# Patient Record
Sex: MALE | Race: ASIAN | NOT HISPANIC OR LATINO | Employment: OTHER | URBAN - METROPOLITAN AREA
[De-identification: names, ages, dates, MRNs, and addresses within clinical notes are randomized per-mention and may not be internally consistent; named-entity substitution may affect disease eponyms.]

---

## 2018-10-13 ENCOUNTER — HOSPITAL ENCOUNTER (EMERGENCY)
Facility: HOSPITAL | Age: 83
Discharge: HOME | End: 2018-10-13
Attending: EMERGENCY MEDICINE
Payer: MEDICARE

## 2018-10-13 VITALS
OXYGEN SATURATION: 98 % | HEART RATE: 74 BPM | RESPIRATION RATE: 18 BRPM | HEIGHT: 62 IN | WEIGHT: 140 LBS | BODY MASS INDEX: 25.76 KG/M2 | SYSTOLIC BLOOD PRESSURE: 111 MMHG | TEMPERATURE: 98 F | DIASTOLIC BLOOD PRESSURE: 56 MMHG

## 2018-10-13 DIAGNOSIS — R93.89 ABNORMAL CHEST X-RAY: ICD-10-CM

## 2018-10-13 DIAGNOSIS — R07.9 CHEST PAIN: ICD-10-CM

## 2018-10-13 DIAGNOSIS — R50.9 FEVER IN ADULT: Primary | ICD-10-CM

## 2018-10-13 DIAGNOSIS — R53.1 WEAKNESS: ICD-10-CM

## 2018-10-13 DIAGNOSIS — R05.9 COUGH: ICD-10-CM

## 2018-10-13 DIAGNOSIS — J18.9 PNEUMONIA, UNSPECIFIED ORGANISM: ICD-10-CM

## 2018-10-13 LAB
ALBUMIN SERPL BCP-MCNC: 3.1 G/DL
ALP SERPL-CCNC: 66 U/L
ALT SERPL W/O P-5'-P-CCNC: 40 U/L
ANION GAP SERPL CALC-SCNC: 11 MMOL/L
AST SERPL-CCNC: 55 U/L
BACTERIA #/AREA URNS HPF: ABNORMAL /HPF
BASOPHILS # BLD AUTO: ABNORMAL K/UL
BASOPHILS NFR BLD: 0 %
BILIRUB SERPL-MCNC: 0.5 MG/DL
BILIRUB UR QL STRIP: NEGATIVE
BNP SERPL-MCNC: 35 PG/ML
BUN SERPL-MCNC: 29 MG/DL
CALCIUM SERPL-MCNC: 9.7 MG/DL
CHLORIDE SERPL-SCNC: 97 MMOL/L
CLARITY UR: CLEAR
CO2 SERPL-SCNC: 22 MMOL/L
COLOR UR: YELLOW
CREAT SERPL-MCNC: 1.5 MG/DL
DIFFERENTIAL METHOD: ABNORMAL
EOSINOPHIL # BLD AUTO: ABNORMAL K/UL
EOSINOPHIL NFR BLD: 0 %
ERYTHROCYTE [DISTWIDTH] IN BLOOD BY AUTOMATED COUNT: 12.3 %
EST. GFR  (AFRICAN AMERICAN): 49 ML/MIN/1.73 M^2
EST. GFR  (NON AFRICAN AMERICAN): 43 ML/MIN/1.73 M^2
FLUAV AG SPEC QL IA: NEGATIVE
FLUBV AG SPEC QL IA: NEGATIVE
GLUCOSE SERPL-MCNC: 322 MG/DL
GLUCOSE UR QL STRIP: ABNORMAL
HCT VFR BLD AUTO: 33.6 %
HGB BLD-MCNC: 11.8 G/DL
HGB UR QL STRIP: ABNORMAL
HYALINE CASTS #/AREA URNS LPF: 0 /LPF
HYPOCHROMIA BLD QL SMEAR: ABNORMAL
KETONES UR QL STRIP: NEGATIVE
LACTATE SERPL-SCNC: 0.7 MMOL/L
LACTATE SERPL-SCNC: 1.8 MMOL/L
LEUKOCYTE ESTERASE UR QL STRIP: NEGATIVE
LYMPHOCYTES # BLD AUTO: ABNORMAL K/UL
LYMPHOCYTES NFR BLD: 7 %
MCH RBC QN AUTO: 30.7 PG
MCHC RBC AUTO-ENTMCNC: 35.1 G/DL
MCV RBC AUTO: 88 FL
METAMYELOCYTES NFR BLD MANUAL: 1 %
MICROSCOPIC COMMENT: ABNORMAL
MONOCYTES # BLD AUTO: ABNORMAL K/UL
MONOCYTES NFR BLD: 5 %
NEUTROPHILS NFR BLD: 70 %
NEUTS BAND NFR BLD MANUAL: 17 %
NITRITE UR QL STRIP: NEGATIVE
PH UR STRIP: 5 [PH] (ref 5–8)
PLATELET # BLD AUTO: 249 K/UL
PLATELET BLD QL SMEAR: ABNORMAL
PMV BLD AUTO: 9.5 FL
POCT GLUCOSE: 260 MG/DL (ref 70–110)
POCT GLUCOSE: 306 MG/DL (ref 70–110)
POTASSIUM SERPL-SCNC: 4.1 MMOL/L
PROT SERPL-MCNC: 7.5 G/DL
PROT UR QL STRIP: ABNORMAL
RBC # BLD AUTO: 3.84 M/UL
RBC #/AREA URNS HPF: 1 /HPF (ref 0–4)
SODIUM SERPL-SCNC: 130 MMOL/L
SP GR UR STRIP: 1.02 (ref 1–1.03)
SPECIMEN SOURCE: NORMAL
SQUAMOUS #/AREA URNS HPF: 3 /HPF
TROPONIN I SERPL DL<=0.01 NG/ML-MCNC: 0.01 NG/ML
TROPONIN I SERPL DL<=0.01 NG/ML-MCNC: <0.006 NG/ML
URN SPEC COLLECT METH UR: ABNORMAL
UROBILINOGEN UR STRIP-ACNC: NEGATIVE EU/DL
WBC # BLD AUTO: 6.05 K/UL
WBC #/AREA URNS HPF: 1 /HPF (ref 0–5)
YEAST URNS QL MICRO: ABNORMAL

## 2018-10-13 PROCEDURE — 93005 ELECTROCARDIOGRAM TRACING: CPT

## 2018-10-13 PROCEDURE — 82962 GLUCOSE BLOOD TEST: CPT

## 2018-10-13 PROCEDURE — 80053 COMPREHEN METABOLIC PANEL: CPT

## 2018-10-13 PROCEDURE — 63600175 PHARM REV CODE 636 W HCPCS: Performed by: EMERGENCY MEDICINE

## 2018-10-13 PROCEDURE — 83880 ASSAY OF NATRIURETIC PEPTIDE: CPT

## 2018-10-13 PROCEDURE — 83605 ASSAY OF LACTIC ACID: CPT

## 2018-10-13 PROCEDURE — 96374 THER/PROPH/DIAG INJ IV PUSH: CPT

## 2018-10-13 PROCEDURE — 87400 INFLUENZA A/B EACH AG IA: CPT | Mod: 59

## 2018-10-13 PROCEDURE — 25000003 PHARM REV CODE 250: Performed by: NURSE PRACTITIONER

## 2018-10-13 PROCEDURE — 87040 BLOOD CULTURE FOR BACTERIA: CPT

## 2018-10-13 PROCEDURE — 85007 BL SMEAR W/DIFF WBC COUNT: CPT

## 2018-10-13 PROCEDURE — 25000003 PHARM REV CODE 250: Performed by: EMERGENCY MEDICINE

## 2018-10-13 PROCEDURE — 99285 EMERGENCY DEPT VISIT HI MDM: CPT | Mod: 25

## 2018-10-13 PROCEDURE — 85027 COMPLETE CBC AUTOMATED: CPT

## 2018-10-13 PROCEDURE — 93010 ELECTROCARDIOGRAM REPORT: CPT | Mod: ,,, | Performed by: INTERNAL MEDICINE

## 2018-10-13 PROCEDURE — 81000 URINALYSIS NONAUTO W/SCOPE: CPT

## 2018-10-13 PROCEDURE — 96361 HYDRATE IV INFUSION ADD-ON: CPT

## 2018-10-13 PROCEDURE — 84484 ASSAY OF TROPONIN QUANT: CPT | Mod: 91

## 2018-10-13 RX ORDER — ASPIRIN 325 MG
325 TABLET ORAL
Status: COMPLETED | OUTPATIENT
Start: 2018-10-13 | End: 2018-10-13

## 2018-10-13 RX ORDER — ACETAMINOPHEN 500 MG
1000 TABLET ORAL
Status: COMPLETED | OUTPATIENT
Start: 2018-10-13 | End: 2018-10-13

## 2018-10-13 RX ORDER — DIPHENHYDRAMINE HCL 25 MG
25 CAPSULE ORAL
Status: COMPLETED | OUTPATIENT
Start: 2018-10-13 | End: 2018-10-13

## 2018-10-13 RX ORDER — ALLOPURINOL 100 MG/1
100 TABLET ORAL DAILY
COMMUNITY

## 2018-10-13 RX ORDER — FENOFIBRATE 160 MG/1
160 TABLET ORAL DAILY
COMMUNITY

## 2018-10-13 RX ORDER — DIPHENHYDRAMINE HCL 25 MG
25 CAPSULE ORAL EVERY 6 HOURS PRN
Qty: 20 CAPSULE | Refills: 0 | Status: SHIPPED | OUTPATIENT
Start: 2018-10-13

## 2018-10-13 RX ORDER — MOXIFLOXACIN HYDROCHLORIDE 400 MG/1
400 TABLET ORAL DAILY
Status: DISCONTINUED | OUTPATIENT
Start: 2018-10-14 | End: 2018-10-13

## 2018-10-13 RX ORDER — MOXIFLOXACIN HYDROCHLORIDE 400 MG/1
400 TABLET ORAL
Status: COMPLETED | OUTPATIENT
Start: 2018-10-13 | End: 2018-10-13

## 2018-10-13 RX ORDER — ATORVASTATIN CALCIUM 40 MG/1
40 TABLET, FILM COATED ORAL DAILY
COMMUNITY

## 2018-10-13 RX ORDER — METHYLPREDNISOLONE SOD SUCC 125 MG
125 VIAL (EA) INJECTION
Status: COMPLETED | OUTPATIENT
Start: 2018-10-13 | End: 2018-10-13

## 2018-10-13 RX ORDER — LEVOFLOXACIN 750 MG/1
750 TABLET ORAL DAILY
Qty: 5 TABLET | Refills: 0 | Status: SHIPPED | OUTPATIENT
Start: 2018-10-13 | End: 2018-10-18

## 2018-10-13 RX ADMIN — DIPHENHYDRAMINE HYDROCHLORIDE 25 MG: 25 CAPSULE ORAL at 04:10

## 2018-10-13 RX ADMIN — ACETAMINOPHEN 1000 MG: 500 TABLET, FILM COATED ORAL at 03:10

## 2018-10-13 RX ADMIN — SODIUM CHLORIDE 1905 ML: 0.9 INJECTION, SOLUTION INTRAVENOUS at 02:10

## 2018-10-13 RX ADMIN — MOXIFLOXACIN HYDROCHLORIDE 400 MG: 400 TABLET, FILM COATED ORAL at 08:10

## 2018-10-13 RX ADMIN — ASPIRIN 325 MG ORAL TABLET 325 MG: 325 PILL ORAL at 03:10

## 2018-10-13 RX ADMIN — METHYLPREDNISOLONE SODIUM SUCCINATE 125 MG: 125 INJECTION, POWDER, FOR SOLUTION INTRAMUSCULAR; INTRAVENOUS at 04:10

## 2018-10-13 NOTE — ED TRIAGE NOTES
Pt is Armenian speaking, pt son in law reports pt has been feeling weak x1 weak and fever Thursday night, did not report temperature.  Pt reports pain in chest to right side, productive cough, and diarrhea.  Denies chills and sob. Placed on cardiac monitor, bp cuff, and pulse ox.  Will continue to monitor.

## 2018-10-13 NOTE — ED PROVIDER NOTES
Encounter Date: 10/13/2018    SCRIBE #1 NOTE: INika, am scribing for, and in the presence of,  Jerry Gunn MD . I have scribed the following portions of the note - Other sections scribed: HPI, ROS, PE, EKG.       History     Chief Complaint   Patient presents with    Extremity Weakness     pt c/o weakness LUQ pain and fever X 5 days     CC: Extreme Weakness    83 y.o. Male with a past medical history of Hyperlipidemia, HTN, and DM type II presents to ED for emergent evaluation of acute onset extreme weakness that began 1x week ago. Patient's relative reports the patient also experiencing a fever that began on 10/11/18. Patient notes cough with phlegm, mild CP, and left sided flank pain. He notes having diarrhea whenever he takes his prescribed medications. Patient denies dysuria, SOB and history of heart issues. No other associated symptoms. No alleviating factors.           The history is provided by the patient and a relative. A  was used.     Review of patient's allergies indicates:  No Known Allergies  Past Medical History:   Diagnosis Date    Gout     Hyperlipidemia     Hypertension     Type 2 diabetes mellitus      History reviewed. No pertinent surgical history.  History reviewed. No pertinent family history.  Social History     Tobacco Use    Smoking status: Former Smoker     Years: 40.00     Types: Cigarettes     Last attempt to quit: 2000     Years since quittin.8   Substance Use Topics    Alcohol use: No    Drug use: No     Review of Systems   Constitutional: Positive for fever. Negative for appetite change.   HENT: Negative for rhinorrhea and sore throat.    Eyes: Negative for visual disturbance.   Respiratory: Positive for cough (with phlegm ) and shortness of breath.    Cardiovascular: Positive for chest pain (mild).   Gastrointestinal: Positive for diarrhea. Negative for abdominal pain.   Genitourinary: Positive for flank pain (left sided). Negative  for dysuria.   Musculoskeletal: Negative for gait problem.   Skin: Negative for rash.   Neurological: Positive for weakness (extreme). Negative for syncope.       Physical Exam     Initial Vitals [10/13/18 1300]   BP Pulse Resp Temp SpO2   (!) 141/64 (!) 112 20 (!) 103.2 °F (39.6 °C) (!) 92 %      MAP       --         Physical Exam    Nursing note and vitals reviewed.  Constitutional: He is not diaphoretic. No distress.   Thin   HENT:   Head: Normocephalic and atraumatic.   Mouth/Throat: Oropharynx is clear and moist.   Eyes: Conjunctivae and EOM are normal. Pupils are equal, round, and reactive to light. No scleral icterus.   Neck: Normal range of motion. Neck supple. No JVD present.   Cardiovascular: Normal rate, regular rhythm and intact distal pulses.   Pulmonary/Chest: No stridor. No respiratory distress.   B/l coarse breath sounds     Abdominal: Soft. He exhibits no distension. There is no tenderness.   Hyper active bowel sounds   Musculoskeletal: Normal range of motion. He exhibits no edema or tenderness.   Neurological: He is alert and oriented to person, place, and time. He has normal strength. No cranial nerve deficit.   Skin: Skin is warm and dry. No rash noted.   Psychiatric: He has a normal mood and affect.         ED Course   Procedures  Labs Reviewed   CBC W/ AUTO DIFFERENTIAL - Abnormal; Notable for the following components:       Result Value    RBC 3.84 (*)     Hemoglobin 11.8 (*)     Hematocrit 33.6 (*)     Lymph% 7.0 (*)     All other components within normal limits   COMPREHENSIVE METABOLIC PANEL - Abnormal; Notable for the following components:    Sodium 130 (*)     CO2 22 (*)     Glucose 322 (*)     BUN, Bld 29 (*)     Creatinine 1.5 (*)     Albumin 3.1 (*)     AST 55 (*)     eGFR if  49 (*)     eGFR if non  43 (*)     All other components within normal limits   URINALYSIS, REFLEX TO URINE CULTURE - Abnormal; Notable for the following components:    Protein, UA  1+ (*)     Glucose, UA 3+ (*)     Occult Blood UA 1+ (*)     All other components within normal limits    Narrative:     Preferred Collection Type->Urine, Clean Catch   URINALYSIS MICROSCOPIC - Abnormal; Notable for the following components:    Yeast, UA Rare (*)     All other components within normal limits    Narrative:     Preferred Collection Type->Urine, Clean Catch   POCT GLUCOSE - Abnormal; Notable for the following components:    POCT Glucose 260 (*)     All other components within normal limits   POCT GLUCOSE - Abnormal; Notable for the following components:    POCT Glucose 306 (*)     All other components within normal limits   CULTURE, BLOOD    Narrative:     Aerobic and anaerobic   CULTURE, BLOOD    Narrative:     Aerobic and anaerobic   TROPONIN I   TROPONIN I   B-TYPE NATRIURETIC PEPTIDE   LACTIC ACID, PLASMA   INFLUENZA A AND B ANTIGEN   LACTIC ACID, PLASMA     EKG Readings: (Independently Interpreted)   Initial Reading: No STEMI. Rhythm: Sinus Tachycardia. Heart Rate: 112. ST Segments: Non-Specific ST Segment Depression. T Waves Flipped: AVL. Axis: Left Axis Deviation.       Imaging Results          CT Chest Without Contrast (Final result)     Abnormal  Result time 10/13/18 17:09:54    Final result by Andrez Belcher MD (10/13/18 17:09:54)                 Impression:      1. Asymmetric right-sided nonspecific peribronchial thickening which can be seen with small vessel disease including pulmonary edema versus small airways disease including infectious or inflammatory pneumonia.  Associated small layering right pleural effusion.  No focal consolidation.  Follow-up as warranted.  2. Mediastinal and questionable right hilar lymphadenopathy, nonspecific.  3. Several bilateral ground-glass and solid subcentimeter pulmonary nodules measuring up to 5-6 mm.  For multiple solid nodules with any 6 mm or greater, Fleischner Society guidelines recommend follow up with non-contrast chest CT at 3-6 months and  18-24 months after discovery.  4. Coronary and systemic atherosclerosis.  5. Few additional findings as above.  This report was flagged in Epic as abnormal.      Electronically signed by: Andrez Belcher MD  Date:    10/13/2018  Time:    17:09             Narrative:    EXAMINATION:  CT CHEST WITHOUT CONTRAST    CLINICAL HISTORY:  Cough, persistent, xray nondiagnostic;    TECHNIQUE:  Low dose axial images, sagittal and coronal reformations were obtained from the thoracic inlet to the lung bases. Contrast was not administered.    COMPARISON:  Chest radiograph earlier same day and 04/27/2016    FINDINGS:  Please note that the lack of intravenous contrast limits evaluation of soft tissue and vascular structures.    Beam hardening with streak artifact and respiratory motion somewhat limits evaluation.    Structures at the base of the neck are within normal limits.  Extrathoracic soft tissues are within normal limits.    Central airways are midline and remain patent.  Small layering right pleural effusion.  There is asymmetric right-sided nonspecific peribronchial thickening.  Scattered linear opacities consistent with subsegmental scarring versus atelectasis.  Mild biapical pleuroparenchymal scarring with mild degree of scattered areas of peripheral reticulation in the upper lobes.  There are several scattered small pulmonary nodules throughout both lungs some of which are ground-glass and others are solid.  The largest on the right measures 5-6 mm within the superior segment right lower lobe, and the largest on the left measures 5-6 mm within the left lower lobe.  No large consolidation or pneumothorax.    Left-sided arch.  Calcific atherosclerosis of the thoracic aorta without aneurysm.  The heart is normal in size without significant pericardial fluid, noting coronary arterial calcifications.  Esophagus appears grossly normal in course and caliber.  There are multiple scattered enlarged mediastinal lymph nodes measuring  up to 15 mm at the right paratracheal region.  Questionable enlarged right hilar lymph node measuring 13 mm versus volume averaging from pulmonary vasculature on this noncontrast exam.  No definite left hilar or axillary lymphadenopathy.    Included upper abdomen is within normal limits.  Included osseous structures show generalized osteopenia and age-related mild degenerative change without acute or destructive process seen.                               X-Ray Chest PA And Lateral (Final result)     Abnormal  Result time 10/13/18 15:09:14    Final result by Kwadwo Booth MD (10/13/18 15:09:14)                 Impression:      Prominence of the pulmonary pam, right more than left.  This could reflect pulmonary vascular engorgement but underlying lesion not entirely excluded.  Clinical correlation and follow-up suggested.    This report was flagged in Epic as abnormal.      Electronically signed by: Kwadwo Booth MD  Date:    10/13/2018  Time:    15:09             Narrative:    EXAMINATION:  XR CHEST PA AND LATERAL    CLINICAL HISTORY:  Weakness    TECHNIQUE:  PA and lateral views of the chest were performed.    COMPARISON:  04/27/2016    FINDINGS:  Multiple overlying cardiac monitoring leads. The cardiomediastinal silhouette is normal in size and midline.  Mild atherosclerotic calcification of the aortic arch.  Prominence of the pam, right more than left.  Margin somewhat indistinct.    The lungs appear clear without confluent pulmonary parenchymal opacity. No large volume of pleural fluid.  No pneumothorax..    Osseous structures appear intact.                                 Medical Decision Making:   History:   Old Medical Records: I decided to obtain old medical records.  Initial Assessment:   Past cardiology consult for PE and hypertension  Differential Diagnosis:   Pneumonia  URI  Gastroenteritis  UTI  Occult infection  Sepsis  Independently Interpreted Test(s):   I have ordered and independently  interpreted EKG Reading(s) - see prior notes  Clinical Tests:   Lab Tests: Ordered and Reviewed  Radiological Study: Ordered and Reviewed  Medical Tests: Ordered and Reviewed  Sepsis Perfusion Assessment: I attest, a sepsis perfusion exam was performed within 6 hours of Septic Shock presentation, following fluid resuscitation.  ED Management:  Patient febrile on arrival.  Chest x-ray with hilar adenopathy concerning for possible pneumonia.  CT scan of chest findings reviewed.  Given fever patient to be treated empirically for. Lactic acid is within normal limits. Patient has mild EFREN.  On reassessment he reports feeling much better, temperature and tachycardia normalized. lungs are clear to auscultation.  He is satting well on room air.  Bandemia noted patient and family member informed of findings.  Patient reports that he feels much better and wishes to be discharged. Given lack of leukocytosis and with out an elevated lactic acid patient is not definitively need to be admitted.  Given dose of antibiotics in the emergency room and given course of Levaquin to continue.  Given strict return precautions and counseled to follow up with his primary physician on Monday.  Patient and family member expressed understanding and agreement with treatment plan.    I spent a total of 30 minutes caring for and overseeing the critical care of this patient. Critical care time was spent treating or preventing major adverse outcome resulting from fever, tachycardia.  Patient was specifically observed and treated with IV hydration EKG interpretation of EKG chest x-ray, lab testing interpretation of testing empiric antibiotics, CT chest, reassessment, followed by discussion with patient and family regarding findings, necessary treatment and appropriate follow-up.  Patient and family expressed wish to be discharged.  Given strict instructions to return to emergency room for any new or worsening symptoms and counseled to follow up with  primary physician.  This chart completed using dictation software, as a result there may be some typographical errors.             Scribe Attestation:   Scribe #1: I performed the above scribed service and the documentation accurately describes the services I performed. I attest to the accuracy of the note.    Attending Attestation:           Physician Attestation for Scribe:  Physician Attestation Statement for Scribe #1: I, Jerry Gunn MD , reviewed documentation, as scribed by Nika Chaves in my presence, and it is both accurate and complete.                 ED Course as of Oct 18 0849   Sat Oct 13, 2018   1619 Patient notedTo have urticarial rash to groin.  There is no crepitation or evidence of sana's gangrene.  Rash began after patient was given aspirin and Tylenol.  Patient reports taking aspirin and Tylenol in the past without any allergic reaction.  Will continue to monitor patient.  He has no tongue lip or face swelling. He has no wheezing.  [SG]      ED Course User Index  [SG] Jerry Gunn MD     Clinical Impression:   The primary encounter diagnosis was Fever in adult. Diagnoses of Chest pain, Weakness, Cough, Abnormal chest x-ray, and Pneumonia, unspecified organism were also pertinent to this visit.      Disposition:   Disposition: Discharged  Condition: Fair                        Jerry Gunn MD  10/18/18 0848

## 2018-10-14 NOTE — DISCHARGE INSTRUCTIONS
Complete entire course of antibiotics.  Use over-the-counter medications as needed for fever.  Return to emergency room for persistent fever, chest pain, breathing difficulty or any new, worsening or concerning symptoms. See your primary physician within 48 hr or return to the emergency room for repeat evaluation.

## 2018-10-18 LAB
BACTERIA BLD CULT: NORMAL
BACTERIA BLD CULT: NORMAL

## 2019-01-09 ENCOUNTER — HOSPITAL ENCOUNTER (OUTPATIENT)
Dept: RADIOLOGY | Facility: HOSPITAL | Age: 84
Discharge: HOME | End: 2019-01-09
Attending: INTERNAL MEDICINE
Payer: MEDICARE

## 2019-01-09 DIAGNOSIS — J18.1 UNRESOLVED LOBAR PNEUMONIA: Primary | ICD-10-CM

## 2019-01-09 DIAGNOSIS — J18.1 UNRESOLVED LOBAR PNEUMONIA: ICD-10-CM

## 2019-01-09 PROCEDURE — 71046 X-RAY EXAM CHEST 2 VIEWS: CPT | Mod: 26,HCWC,, | Performed by: RADIOLOGY

## 2019-01-09 PROCEDURE — 71046 X-RAY EXAM CHEST 2 VIEWS: CPT | Mod: TC,FY,HCWC

## 2019-01-09 PROCEDURE — 71046 XR CHEST PA AND LATERAL: ICD-10-PCS | Mod: 26,HCWC,, | Performed by: RADIOLOGY
